# Patient Record
Sex: FEMALE | Race: WHITE
[De-identification: names, ages, dates, MRNs, and addresses within clinical notes are randomized per-mention and may not be internally consistent; named-entity substitution may affect disease eponyms.]

---

## 2017-03-21 ENCOUNTER — HOSPITAL ENCOUNTER (EMERGENCY)
Dept: HOSPITAL 10 - FTE | Age: 57
LOS: 1 days | Discharge: LEFT BEFORE DISCHARGE | End: 2017-03-22
Payer: COMMERCIAL

## 2017-03-21 VITALS
BODY MASS INDEX: 25.78 KG/M2 | WEIGHT: 145.51 LBS | WEIGHT: 145.51 LBS | HEIGHT: 63 IN | HEIGHT: 63 IN | BODY MASS INDEX: 25.78 KG/M2

## 2017-03-21 DIAGNOSIS — S20.01XA: Primary | ICD-10-CM
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
## 2017-03-21 DIAGNOSIS — I10: ICD-10-CM
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
## 2017-03-21 PROCEDURE — 76642 ULTRASOUND BREAST LIMITED: CPT

## 2017-03-21 NOTE — ERD
ER Documentation


Chief Complaint


Date/Time


DATE: 3/21/17 


TIME: 10:11


Chief Complaint


bruise @ the right breast area





HPI


56-year-old female presents to ED complaining of bruising in the medial aspect 

of the right breast.  Patient stated that she first noticed it 6 days ago.  She 

did not have any pain in the right breast.  2 days ago, she noticed a tender 

lump in the area of the bruising.  She does not remember any trauma.  Patient 

has history of rheumatoid arthritis.  She had negative mammogram in December 2016.  Denies any family history of breast cancer or any other type of cancer.  

Denies fever or chills.





ROS


All systems reviewed and are negative except as per history of present illness.





Medications


Home Meds


Reported Medications


Sulfamethoxazole-Trimethoprim* (Bactrim* DS) 1 Tab Tab, 1 TAB PO DAILY


   9/17/13


Propranolol Hcl (Inderal LA) 60 Mg Capsr, 50 MG PO BID


   9/12/13


Losartan Potassium* (Cozaar*) 25 Mg Tablet, 50 MG PO BID


   9/12/13


Naproxen* (Aleve*) 220 Mg Capsule, 220 MG PO Y


   8/10/13





Allergies


Allergies:  


Coded Allergies:  


     No Known Allergy (Unverified , 9/12/13)





PMhx/Soc


History of Surgery:  Yes (breast reduction 83, appendectomy)


Anesthesia Reaction:  No


Hx Neurological Disorder:  No


Hx Respiratory Disorders:  No


Hx Cardiac Disorders:  Yes (htn)


Hx Psychiatric Problems:  No


Hx Miscellaneous Medical Probl:  Yes ( rheumatoid arthritis)


Hx Alcohol Use:  No


Hx Substance Use:  No


Hx Tobacco Use:  No





Physical Exam


Vitals





Vital Signs








  Date Time  Temp Pulse Resp B/P Pulse Ox O2 Delivery O2 Flow Rate FiO2


 


3/21/17 07:54 98.1 84 19 175/95 95   








Physical Exam


General impression:  Well-developed, well-nourished.  Alert, oriented, in no 

acute distress


Head:    Normocephalic, atraumatic.


Respiration:    Normal respiratory effort. Lungs clear to auscultate 

bilaterally. No wheezes, rales or rhonchi.


Cardiovascular: Regular rate and rhythm. No murmurs or extra heart sounds.


Abdomen:    Abdomen normal to inspection. Nontender. No masses or organomegaly. 

Bowel sounds normal.


Breasts:   A 5 cm area of old ecchymosis noted on the medial aspect of the 

right breast.  A 2-3 cm size firm, mobile mass noted within the border of the 

ecchymosis.  Breast exam otherwise normal.  No lymph nodes appreciated in the 

bilateral axilla.


Neuro:    Mental status normal, speech normal. CNS grossly intact. 


Skin:    Normal turgor. No rash or lesions.


Psych:    Normal mood and affect.


Results 24 hrs


PROCEDURE:   Right breast ultrasound. 


 


CLINICAL INDICATION:   Right breast palpable lesion and ecchymosis. 


 


TECHNIQUE:   High-resolution sonography of the right breast was performed in 

the axial and sagittal planes. 


 


COMPARISON:   No prior study is available for comparison. 


 


FINDINGS:


At the site of the palpable lesion and ecchymosis in the 3 o'clock position of 

the right breast, there is a 3.3 x 0.8 x 1.1 cm complex mass with cystic and 

solid components.  There is no other cystic or solid mass.


The breast parenchyma is otherwise normal. 


 


IMPRESSION:


1.  Complex mass with cystic and solid components measuring 3.3 x 0.8 x 1.1 cm 

in the 3 o'clock position of the right breast at the site of the palpable 

lesion and ecchymosis. This may be due to a hematoma.  Neoplasm such as a 

papilloma cannot be excluded.


2.  Any further management regarding this palpable lesion should be based upon 

clinical grounds. 


 


RPTAT: QQ


_____________________________________________ 


.Hugh Mckinney MD, MD           Date    Time 


Electronically viewed and signed by .Hugh Mckinney MD, MD on 03/21/2017 12:21 


 


D:  03/21/2017 12:21  T:  03/21/2017 12:21


.R/





CC: SIDDHARTH PALMER NP





Procedures/MDM


Well-appearing 56-year-old female presented to ED with contusion of the right 

breast.  Breast ultrasound was obtained, a complex mass with cystic and solid 

components measuring 3.3 x 0.8 x 1.1 cm in the 3 o'clock position at the site 

of the palpable lesion and ecchymosis is noted on ultrasound.  This may be due 

to hematoma.  Neoplasm such as a papilloma cannot be excluded.





Patient eloped before the ultrasound results had returned.  I wish to the 

patient via telephone, and informed her of the ultrasound results.  I advised 

patient to follow-up with her PCP for repeat ultrasound in about 2 weeks, and 

obtain a breast biopsy.  Patient expressed understanding.





Departure


Diagnosis:  


 Primary Impression:  


 Contusion of breast, right


Condition:  Good











SIDDHARTH PALMER NP Mar 21, 2017 10:17

## 2017-03-21 NOTE — RADRPT
PROCEDURE:   Right breast ultrasound. 

 

CLINICAL INDICATION:   Right breast palpable lesion and ecchymosis. 

 

TECHNIQUE:   High-resolution sonography of the right breast was performed in the axial and sagittal 
planes. 

 

COMPARISON:   No prior study is available for comparison. 

 

FINDINGS:

At the site of the palpable lesion and ecchymosis in the 3 o'clock position of the right breast, the
re is a 3.3 x 0.8 x 1.1 cm complex mass with cystic and solid components.  There is no other cystic 
or solid mass.

The breast parenchyma is otherwise normal. 

 

IMPRESSION:

1.  Complex mass with cystic and solid components measuring 3.3 x 0.8 x 1.1 cm in the 3 o'clock posi
tion of the right breast at the site of the palpable lesion and ecchymosis. This may be due to a hem
atoma.  Neoplasm such as a papilloma cannot be excluded.

2.  Any further management regarding this palpable lesion should be based upon clinical grounds. 

 

RPTAT: QQ

_____________________________________________ 

.Hugh Mckinney MD, MD           Date    Time 

Electronically viewed and signed by .Hugh Mckinney MD, MD on 03/21/2017 12:21 

 

D:  03/21/2017 12:21  T:  03/21/2017 12:21

.R/

## 2018-01-08 ENCOUNTER — HOSPITAL ENCOUNTER (EMERGENCY)
Age: 58
Discharge: HOME | End: 2018-01-08

## 2018-01-08 ENCOUNTER — HOSPITAL ENCOUNTER (EMERGENCY)
Dept: HOSPITAL 91 - FTE | Age: 58
Discharge: HOME | End: 2018-01-08
Payer: COMMERCIAL

## 2018-01-08 DIAGNOSIS — I10: ICD-10-CM

## 2018-01-08 DIAGNOSIS — N39.0: Primary | ICD-10-CM

## 2018-01-08 LAB
ADD UMIC: YES
UR ASCORBIC ACID: NEGATIVE MG/DL
UR BACTERIA: (no result) /HPF
UR BILIRUBIN (DIP): NEGATIVE MG/DL
UR BLOOD (DIP): (no result) MG/DL
UR CLARITY: (no result)
UR COLOR: (no result)
UR GLUCOSE (DIP): (no result) MG/DL
UR HYALINE CAST: (no result) /HPF
UR KETONES (DIP): NEGATIVE MG/DL
UR LEUKOCYTE ESTERASE (DIP): NEGATIVE LEU/UL
UR MUCUS: (no result) /HPF
UR NITRITE (DIP): POSITIVE MG/DL
UR PH (DIP): 5 (ref 5–9)
UR RBC: > 182 /HPF (ref 0–5)
UR SPECIFIC GRAVITY (DIP): 1.03 (ref 1–1.03)
UR SQUAMOUS EPITHELIAL CELL: (no result) /HPF
UR TOTAL PROTEIN (DIP): (no result) MG/DL
UR UROBILINOGEN (DIP): (no result) MG/DL
UR WBC: 55 /HPF (ref 0–5)

## 2018-01-08 PROCEDURE — 81001 URINALYSIS AUTO W/SCOPE: CPT

## 2018-01-08 PROCEDURE — 99283 EMERGENCY DEPT VISIT LOW MDM: CPT

## 2019-02-08 ENCOUNTER — HOSPITAL ENCOUNTER (EMERGENCY)
Dept: HOSPITAL 10 - FTE | Age: 59
Discharge: HOME | End: 2019-02-08
Payer: COMMERCIAL

## 2019-02-08 ENCOUNTER — HOSPITAL ENCOUNTER (EMERGENCY)
Dept: HOSPITAL 91 - FTE | Age: 59
Discharge: HOME | End: 2019-02-08
Payer: COMMERCIAL

## 2019-02-08 VITALS
WEIGHT: 147.27 LBS | BODY MASS INDEX: 25.14 KG/M2 | BODY MASS INDEX: 25.14 KG/M2 | HEIGHT: 64 IN | HEIGHT: 64 IN | WEIGHT: 147.27 LBS

## 2019-02-08 VITALS — RESPIRATION RATE: 18 BRPM | HEART RATE: 110 BPM | SYSTOLIC BLOOD PRESSURE: 147 MMHG | DIASTOLIC BLOOD PRESSURE: 75 MMHG

## 2019-02-08 DIAGNOSIS — I10: ICD-10-CM

## 2019-02-08 DIAGNOSIS — R30.0: Primary | ICD-10-CM

## 2019-02-08 LAB — URINE PH (DIP) POC: 6 (ref 5–8.5)

## 2019-02-08 PROCEDURE — 99283 EMERGENCY DEPT VISIT LOW MDM: CPT

## 2019-02-08 PROCEDURE — 81003 URINALYSIS AUTO W/O SCOPE: CPT

## 2019-02-08 PROCEDURE — 87086 URINE CULTURE/COLONY COUNT: CPT

## 2019-02-08 NOTE — ERD
ER Documentation


Chief Complaint


Chief Complaint





painful/burning urination/blood in urine x 1 day





HPI


58-year-old female who presents complaining of dysuria and urinary frequency 


with some light spotting that began today.  She has a history of frequent 


urinary tract infections in which she sees a urologist so she knows that she is 


getting a urine tract infection and wanted to get on top of this 1 early.  She 


states Cipro works best for her.  No fever.  No nausea or vomiting.  Secondarily


she is complaining of pain in her upper extremities secondary to rheumatoid arth


ritis.





ROS


All systems reviewed and are negative except as per history of present illness.





Medications


Home Meds


Active Scripts


Hydrocodone/Acetaminophen (Norco 5-325 Tablet) 1 Each Tablet, 1 EACH PO Q6, #15 


TAB


   Prov:BJORN LILLY PA-C         2/8/19


Ciprofloxacin Hcl* (Ciprofloxacin Hcl*) 500 Mg Tablet, 500 MG PO BID, #14 TAB


   Prov:BJORN LILLY PA-C         2/8/19


Ciprofloxacin Hcl* (Ciprofloxacin Hcl*) 500 Mg Tablet, 500 MG PO BID for 10 


Days, TAB


   Prov:FRANCISCA SANCHEZ         1/8/18


Reported Medications


Sulfamethoxazole-Trimethoprim* (Bactrim* DS) 1 Tab Tab, 1 TAB PO DAILY


   9/17/13


Propranolol Hcl (Inderal LA) 60 Mg Capsr, 50 MG PO BID


   9/12/13


Losartan Potassium* (Cozaar*) 25 Mg Tablet, 50 MG PO BID


   9/12/13


Naproxen* (Aleve*) 220 Mg Capsule, 220 MG PO PRN


   8/10/13





Allergies


Allergies:  


Coded Allergies:  


     No Known Allergy (Unverified , 1/8/18)





PMhx/Soc


History of Surgery:  Yes (breast surgery)


Anesthesia Reaction:  No


Hx Neurological Disorder:  No


Hx Respiratory Disorders:  No


Hx Cardiac Disorders:  Yes (htn)


Hx Psychiatric Problems:  No


Hx Miscellaneous Medical Probl:  Yes (uti)


Hx Alcohol Use:  No


Hx Substance Use:  No


Hx Tobacco Use:  No





FmHx


Family History:  No diabetes





Physical Exam


Vitals





Vital Signs


  Date      Temp  Pulse  Resp  B/P (MAP)   Pulse Ox  O2          O2 Flow    FiO2


Time                                                 Delivery    Rate


    2/8/19  98.2    110    18      147/75        97


     21:54                           (99)





Physical Exam


INITIAL VITAL SIGNS: Reviewed by me


GENERAL: Awake, alert and oriented x 4, well appearing, nontoxic, speaking in 


full sentences. No acute distress


HEAD: Atraumatic


NECK: Supple. No masses. Full range of motion.  No meningismus. No midline 


tenderness. 


RESPIRATORY: Clear to auscultation bilaterally. Symmetric chest wall rise.  No 


wheezing or rales. No accessory muscle use.  


CV: Regular rate and rhythm. No murmurs, rubs, or gallops.  


ABDOMEN: Soft, non-distended. Nontender. Negative Rutledge. Negative McBurneys 


point tenderness. No CVA tenderness bilaterally. No guarding. No rebound. 





EXTREMITIES: No clubbing or cyanosis. No edema. Moving all extremities normally.





Procedures/MDM


Urine sent for culture.  Patient started on Cipro.  Given small amount of Norco 


for her flareup of arthritic rheumatoid arthritis pain.  Patient counseled 


regarding my diagnostic impression and care plan. Prior to discharge all 


questions answered. Pt agrees with treatment plan and understands strict return 


precautions. Pt is instructed to follow up with primary care provider within 24-


48 hours. Precautionary instructions provided including instructions to return 


to the ER if not improving or for any worsening or changing symptoms or 


concerns.





Departure


Diagnosis:  


   Primary Impression:  


   Dysuria


Condition:  Stable


Patient Instructions:  Dysuria





Additional Instructions:  


Call your primary care doctor TOMORROW for an appointment during the next 1-2 


days.See the doctor sooner or return here if your condition worsens before your 


appointment time.











BJORN LILLY PA-C             Feb 8, 2019 23:10

## 2019-02-18 ENCOUNTER — HOSPITAL ENCOUNTER (EMERGENCY)
Dept: HOSPITAL 91 - E/R | Age: 59
Discharge: HOME | End: 2019-02-18
Payer: COMMERCIAL

## 2019-02-18 ENCOUNTER — HOSPITAL ENCOUNTER (EMERGENCY)
Dept: HOSPITAL 10 - E/R | Age: 59
Discharge: HOME | End: 2019-02-18
Payer: COMMERCIAL

## 2019-02-18 VITALS — SYSTOLIC BLOOD PRESSURE: 189 MMHG | RESPIRATION RATE: 20 BRPM | DIASTOLIC BLOOD PRESSURE: 88 MMHG | HEART RATE: 90 BPM

## 2019-02-18 VITALS
WEIGHT: 146.83 LBS | HEIGHT: 66 IN | BODY MASS INDEX: 23.6 KG/M2 | HEIGHT: 66 IN | WEIGHT: 146.83 LBS | BODY MASS INDEX: 23.6 KG/M2

## 2019-02-18 DIAGNOSIS — I10: ICD-10-CM

## 2019-02-18 DIAGNOSIS — N30.10: Primary | ICD-10-CM

## 2019-02-18 LAB
ADD UMIC: YES
UR ASCORBIC ACID: NEGATIVE MG/DL
UR BACTERIA: (no result) /HPF
UR BILIRUBIN (DIP): NEGATIVE MG/DL
UR BLOOD (DIP): NEGATIVE MG/DL
UR CLARITY: (no result)
UR COLOR: (no result)
UR GLUCOSE (DIP): NEGATIVE MG/DL
UR KETONES (DIP): NEGATIVE MG/DL
UR LEUKOCYTE ESTERASE (DIP): NEGATIVE LEU/UL
UR NITRITE (DIP): POSITIVE MG/DL
UR PH (DIP): 5 (ref 5–9)
UR RBC: 5 /HPF (ref 0–5)
UR SPECIFIC GRAVITY (DIP): 1.03 (ref 1–1.03)
UR SQUAMOUS EPITHELIAL CELL: (no result) /HPF
UR TOTAL PROTEIN (DIP): NEGATIVE MG/DL
UR UROBILINOGEN (DIP): (no result) MG/DL
UR WBC: 4 /HPF (ref 0–5)

## 2019-02-18 PROCEDURE — 99283 EMERGENCY DEPT VISIT LOW MDM: CPT

## 2019-02-18 PROCEDURE — 87086 URINE CULTURE/COLONY COUNT: CPT

## 2019-02-18 PROCEDURE — 81001 URINALYSIS AUTO W/SCOPE: CPT

## 2019-02-18 NOTE — ERD
ER Documentation


Chief Complaint


Chief Complaint





Complains of urine problems Hx of UTI





HPI


58-year-old female who presents to the emergency room complaining of dysuria 


urgency and frequency.  The patient states that several weeks ago she was 


started on Cipro but a very short course.  She states that she was recently 


started on Bactrim and still has symptoms of urinary frequency urgency and 


dysuria.  She states recurrent episodes of cystitis.  The only thing that 


usually works for her is 14 days of ciprofloxacin.  She is requesting a 


prescription.  She denies any fevers chills or flank pain.  Blood pressure noted


to be elevated no chest pain or strokelike symptoms.  Patient has followed up 


with a urologist but not recently.





ROS


All systems reviewed and are negative except as per history of present illness.





Medications


Home Meds


Active Scripts


Ciprofloxacin Hcl* (Ciprofloxacin Hcl*) 500 Mg Tablet, 500 MG PO BID for 14 


Days, TAB


   Prov:FRANNY VIDAL MD         2/18/19


Hydrocodone/Acetaminophen (Norco 5-325 Tablet) 1 Each Tablet, 1 EACH PO Q6, #15 


TAB


   Prov:BJORN LILLY PA-C         2/8/19


Ciprofloxacin Hcl* (Ciprofloxacin Hcl*) 500 Mg Tablet, 500 MG PO BID, #14 TAB


   Prov:BJORN LILLY PA-C         2/8/19


Ciprofloxacin Hcl* (Ciprofloxacin Hcl*) 500 Mg Tablet, 500 MG PO BID for 10 


Days, TAB


   Prov:FRANCISCA SANCHEZ         1/8/18


Reported Medications


Sulfamethoxazole-Trimethoprim* (Bactrim* DS) 1 Tab Tab, 1 TAB PO DAILY


   9/17/13


Propranolol Hcl (Inderal LA) 60 Mg Capsr, 50 MG PO BID


   9/12/13


Losartan Potassium* (Cozaar*) 25 Mg Tablet, 50 MG PO BID


   9/12/13


Naproxen* (Aleve*) 220 Mg Capsule, 220 MG PO PRN


   8/10/13





Allergies


Allergies:  


Coded Allergies:  


     No Known Allergy (Unverified , 1/8/18)





PMhx/Soc


History of Surgery:  Yes (breast surgery)


Anesthesia Reaction:  No


Hx Neurological Disorder:  No


Hx Respiratory Disorders:  No


Hx Cardiac Disorders:  Yes (htn)


Hx Psychiatric Problems:  No


Hx Miscellaneous Medical Probl:  Yes (uti)


Hx Alcohol Use:  No


Hx Substance Use:  No


Hx Tobacco Use:  No





FmHx


Family History:  No diabetes





Physical Exam


Vitals





Vital Signs


  Date      Temp  Pulse  Resp  B/P (MAP)   Pulse Ox  O2          O2 Flow    FiO2


Time                                                 Delivery    Rate


   2/18/19  97.0     90    20      189/88        98  Room Air


     19:21                          (121)


   2/18/19  97.0     88    20      201/88        98


     17:46                          (125)





Physical Exam


General: Well developed, well nourished, no acute distress


Head: Normocephalic, atraumatic.


Eyes: Pupils equally reactive, EOM intact


ENT: Moist mucous membranes


Neck: Supple, no lymphadenopathy


Respiratory: Lungs clear bilaterally, no distress


Cardiovascular: RRR, no murmurs, rubs, or gallops


Abdominal: Soft, non-tender, non-distended, no peritoneal signs


Back: No CVA tenderness


: Deferred


MSK: No edema, no unilateral swelling, 5/5 strength


Neurologic: Alert and oriented, moving all extremities, normal speech, no focal 


weakness, no cerebellar signs


Skin: No rash


Psych: Normal mood





Procedures/MDM


The patient is requesting for a 2-week prescription for ciprofloxacin.  We d


iscussed the risk benefits and alternatives.  I believe it is very possible that


the patient has interstitial cystitis rather than recurrent urinary tract 


infections.  I strongly recommend urology follow-up.  Most recent culture 


results was mixed organisms.  I believe a urinalysis and urine culture today 


would be reasonable.  The patient is currently on Bactrim.  I do not believe t


his is consistent with pyelonephritis or systemic infection that would warrant 


inpatient hospitalization.  This is a subacute and chronic issue for the 


patient.





Patient's blood pressure is elevated but she states that she did not take her 


blood pressure medication today.  No symptoms and no evidence of endorgan 


dysfunction.





The patient can be safely discharged with strict return precautions discussed 


and understood by the patient.





The patient does not have an identifiable emergent medical condition that 


warrants inpatient hospitalization at this time.  The patient is deemed safe for


discharge with outpatient follow-up.





We discussed follow up with the patient's primary care doctor within 24 to 48 


hours as needed.  We also discussed return to the emergency room for worsening 


symptoms or worsening condition.





Outpatient referral: Urology





Discharge Medications:


Cipro





Departure


Diagnosis:  


   Primary Impression:  


   UTI (urinary tract infection)


   Urinary tract infection type:  acute cystitis  Hematuria presence:  without 


   hematuria  Qualified Codes:  N30.00 - Acute cystitis without hematuria


   Additional Impressions:  


   Interstitial cystitis


   Asymptomatic hypertensive urgency


Condition:  Stable


Patient Instructions:  Understanding Urinary Tract Infections (UTIs)


Referrals:  


CARMEN YARBROUGH MD





Additional Instructions:  


Call your primary care doctor TOMORROW for an appointment during the next 1 


WEEK.Tell the  that you were referred from this facility.See the doctor


sooner or return here if your  condition worsens before your appointment time.











FRANNY VIDAL MD          Feb 18, 2019 19:25

## 2019-03-04 ENCOUNTER — HOSPITAL ENCOUNTER (EMERGENCY)
Dept: HOSPITAL 91 - FTE | Age: 59
LOS: 1 days | Discharge: HOME | End: 2019-03-05
Payer: COMMERCIAL

## 2019-03-04 ENCOUNTER — HOSPITAL ENCOUNTER (EMERGENCY)
Dept: HOSPITAL 10 - FTE | Age: 59
LOS: 1 days | Discharge: HOME | End: 2019-03-05
Payer: COMMERCIAL

## 2019-03-04 VITALS — HEIGHT: 55 IN | BODY MASS INDEX: 34.74 KG/M2 | WEIGHT: 150.13 LBS

## 2019-03-04 DIAGNOSIS — N39.0: Primary | ICD-10-CM

## 2019-03-04 DIAGNOSIS — I10: ICD-10-CM

## 2019-03-04 LAB
ADD MAN DIFF?: NO
ADD UMIC: YES
ANION GAP: 12 (ref 5–13)
BASOPHIL #: 0.1 10^3/UL (ref 0–0.1)
BASOPHILS %: 0.9 % (ref 0–2)
BLOOD UREA NITROGEN: 20 MG/DL (ref 7–20)
CALCIUM: 9.1 MG/DL (ref 8.4–10.2)
CARBON DIOXIDE: 17 MMOL/L (ref 21–31)
CHLORIDE: 113 MMOL/L (ref 97–110)
CREATININE: 0.63 MG/DL (ref 0.44–1)
EOSINOPHILS #: 0.3 10^3/UL (ref 0–0.5)
EOSINOPHILS %: 2.4 % (ref 0–7)
GLUCOSE: 97 MG/DL (ref 70–220)
HEMATOCRIT: 36.2 % (ref 37–47)
HEMOGLOBIN: 11.7 G/DL (ref 12–16)
IMMATURE GRANS #M: 0.16 10^3/UL (ref 0–0.03)
IMMATURE GRANS % (M): 1.3 % (ref 0–0.43)
LYMPHOCYTES #: 3.2 10^3/UL (ref 0.8–2.9)
LYMPHOCYTES %: 26.2 % (ref 15–51)
MEAN CORPUSCULAR HEMOGLOBIN: 34.1 PG (ref 29–33)
MEAN CORPUSCULAR HGB CONC: 32.3 G/DL (ref 32–37)
MEAN CORPUSCULAR VOLUME: 105.5 FL (ref 82–101)
MEAN PLATELET VOLUME: 8.8 FL (ref 7.4–10.4)
MONOCYTE #: 1 10^3/UL (ref 0.3–0.9)
MONOCYTES %: 7.9 % (ref 0–11)
NEUTROPHIL #: 7.5 10^3/UL (ref 1.6–7.5)
NEUTROPHILS %: 61.3 % (ref 39–77)
NUCLEATED RED BLOOD CELLS #: 0 10^3/UL (ref 0–0)
NUCLEATED RED BLOOD CELLS%: 0 /100WBC (ref 0–0)
PLATELET COUNT: 344 10^3/UL (ref 140–415)
POTASSIUM: 3.5 MMOL/L (ref 3.5–5.1)
RED BLOOD COUNT: 3.43 10^6/UL (ref 4.2–5.4)
RED CELL DISTRIBUTION WIDTH: 14.2 % (ref 11.5–14.5)
SODIUM: 142 MMOL/L (ref 135–144)
UR ASCORBIC ACID: NEGATIVE MG/DL
UR BACTERIA: (no result) /HPF
UR BILIRUBIN (DIP): NEGATIVE MG/DL
UR BLOOD (DIP): (no result) MG/DL
UR CLARITY: (no result)
UR COLOR: (no result)
UR GLUCOSE (DIP): NEGATIVE MG/DL
UR KETONES (DIP): NEGATIVE MG/DL
UR LEUKOCYTE ESTERASE (DIP): (no result) LEU/UL
UR NITRITE (DIP): NEGATIVE MG/DL
UR PH (DIP): 5 (ref 5–9)
UR RBC: > 182 /HPF (ref 0–5)
UR SPECIFIC GRAVITY (DIP): 1.02 (ref 1–1.03)
UR SQUAMOUS EPITHELIAL CELL: (no result) /HPF
UR TOTAL PROTEIN (DIP): (no result) MG/DL
UR UROBILINOGEN (DIP): NEGATIVE MG/DL
UR WBC: 27 /HPF (ref 0–5)
WHITE BLOOD COUNT: 12.3 10^3/UL (ref 4.8–10.8)

## 2019-03-04 PROCEDURE — 36415 COLL VENOUS BLD VENIPUNCTURE: CPT

## 2019-03-04 PROCEDURE — 99285 EMERGENCY DEPT VISIT HI MDM: CPT

## 2019-03-04 PROCEDURE — 85025 COMPLETE CBC W/AUTO DIFF WBC: CPT

## 2019-03-04 PROCEDURE — 87086 URINE CULTURE/COLONY COUNT: CPT

## 2019-03-04 PROCEDURE — 96375 TX/PRO/DX INJ NEW DRUG ADDON: CPT

## 2019-03-04 PROCEDURE — 80048 BASIC METABOLIC PNL TOTAL CA: CPT

## 2019-03-04 PROCEDURE — 96366 THER/PROPH/DIAG IV INF ADDON: CPT

## 2019-03-04 PROCEDURE — 81001 URINALYSIS AUTO W/SCOPE: CPT

## 2019-03-04 PROCEDURE — 96365 THER/PROPH/DIAG IV INF INIT: CPT

## 2019-03-04 PROCEDURE — 74177 CT ABD & PELVIS W/CONTRAST: CPT

## 2019-03-04 RX ADMIN — CEFTRIAXONE 1 MLS/HR: 1 INJECTION, SOLUTION INTRAVENOUS at 23:36

## 2019-03-04 RX ADMIN — THIAMINE HYDROCHLORIDE 1 MLS/HR: 100 INJECTION, SOLUTION INTRAMUSCULAR; INTRAVENOUS at 23:37

## 2019-03-04 RX ADMIN — MORPHINE SULFATE 1 MG: 2 INJECTION, SOLUTION INTRAMUSCULAR; INTRAVENOUS at 23:36

## 2019-03-04 RX ADMIN — PHENAZOPYRIDINE HYDROCHLORIDE 1 MG: 100 TABLET ORAL at 23:36

## 2019-03-04 NOTE — ERD
ER Documentation


Chief Complaint


Chief Complaint





bib self, cc: hematuria and dysuria for 2 weeks,





HPI


58-year-old female, presents the emergency department, complaining of 2 weeks 


with persistent dysuria and hematuria, associated with pelvic pressure.  


Otherwise no fever, no chills, no nausea or vomiting.





ROS


All systems reviewed and are negative except as per history of present illness.





Medications


Home Meds


Active Scripts


Hydrocodone/Acetaminophen (Norco 5-325 Tablet) 1 Each Tablet, 1 TAB PO BID PRN 


for PAIN, #8 TAB


   Prov:JM ENRIQUEZ MD         3/5/19


Phenazopyridine Hcl* (Pyridium*) 200 Mg Tab, 200 MG PO TID PRN for URINARY PAIN,


#6 TAB


   Prov:JM ENRIQUEZ MD         3/5/19


Nitrofurantoin Monohyd Macrocr* (Macrobid*) 100 Mg Capsr, 100 MG PO BID for 14 


Days, #28 CAP


   Prov:JM ENRIQUEZ MD         3/5/19


Ciprofloxacin Hcl* (Ciprofloxacin Hcl*) 500 Mg Tablet, 500 MG PO BID for 14 


Days, TAB


   Prov:FRANNY VIDAL MD         19


Hydrocodone/Acetaminophen (Norco 5-325 Tablet) 1 Each Tablet, 1 EACH PO Q6, #15 


TAB


   Prov:BJORN LILLY PA-C         19


Ciprofloxacin Hcl* (Ciprofloxacin Hcl*) 500 Mg Tablet, 500 MG PO BID, #14 TAB


   Prov:BJORN LILLY PA-C         19


Ciprofloxacin Hcl* (Ciprofloxacin Hcl*) 500 Mg Tablet, 500 MG PO BID for 10 


Days, TAB


   Prov:FRANCISCA SANCHEZ         18


Reported Medications


Sulfamethoxazole-Trimethoprim* (Bactrim* DS) 1 Tab Tab, 1 TAB PO DAILY


   13


Propranolol Hcl (Inderal LA) 60 Mg Capsr, 50 MG PO BID


   13


Losartan Potassium* (Cozaar*) 25 Mg Tablet, 50 MG PO BID


   13


Naproxen* (Aleve*) 220 Mg Capsule, 220 MG PO PRN


   8/10/13





Allergies


Allergies:  


Coded Allergies:  


     No Known Allergy (Unverified , 18)





PMhx/Soc


History of Surgery:  Yes (breast surgery)


Anesthesia Reaction:  No


Hx Neurological Disorder:  No


Hx Respiratory Disorders:  No


Hx Cardiac Disorders:  Yes (htn)


Hx Psychiatric Problems:  No


Hx Miscellaneous Medical Probl:  Yes (uti)


Hx Alcohol Use:  No


Hx Substance Use:  No


Hx Tobacco Use:  No





Physical Exam


Vitals





Vital Signs


  Date      Temp  Pulse  Resp  B/P (MAP)   Pulse Ox  O2          O2 Flow    FiO2


Time                                                 Delivery    Rate


    3/4/19  98.3    102    20      121/93       100


     19:17                          (102)





Physical Exam


Const:   No acute distress


Head:   Atraumatic 


Eyes:    Normal Conjunctiva


ENT:    Normal External Ears, Nose and Mouth.


Neck:               Full range of motion. No meningismus.


Resp:   Clear to auscultation bilaterally


Cardio:   Regular rate and rhythm, no murmurs


Abd:    Soft, non tender, non distended. Normal bowel sounds


Skin:   No petechiae or rashes


Back:   No midline or flank tenderness


Ext:    No cyanosis, or edema


Neur:   Awake and alert


Psych:    Normal Mood and Affect


Result Diagram:  


3/4/19 2325                                                                     


          3/4/19 2325





Results 24 hrs





Laboratory Tests


              Test
                                   3/4/19
23:25


              White Blood Count                      12.3 10^3/ul


              Red Blood Count                        3.43 10^6/ul


              Hemoglobin                                11.7 g/dl


              Hematocrit                                   36.2 %


              Mean Corpuscular Volume                    105.5 fl


              Mean Corpuscular Hemoglobin                 34.1 pg


              Mean Corpuscular Hemoglobin
Concent      32.3 g/dl 



              Red Cell Distribution Width                  14.2 %


              Platelet Count                          344 10^3/UL


              Mean Platelet Volume                         8.8 fl


              Immature Granulocytes %                     1.300 %


              Neutrophils %                                61.3 %


              Lymphocytes %                                26.2 %


              Monocytes %                                   7.9 %


              Eosinophils %                                 2.4 %


              Basophils %                                   0.9 %


              Nucleated Red Blood Cells %             0.0 /100WBC


              Immature Granulocytes #               0.160 10^3/ul


              Neutrophils #                           7.5 10^3/ul


              Lymphocytes #                           3.2 10^3/ul


              Monocytes #                             1.0 10^3/ul


              Eosinophils #                           0.3 10^3/ul


              Basophils #                             0.1 10^3/ul


              Nucleated Red Blood Cells #             0.0 10^3/ul


              Urine Color                          RED


              Urine Clarity                        CLOUDY


              Urine pH                                        5.0


              Urine Specific Gravity                        1.025


              Urine Ketones                        NEGATIVE mg/dL


              Urine Nitrite                        NEGATIVE mg/dL


              Urine Bilirubin                      NEGATIVE mg/dL


              Urine Urobilinogen                   NEGATIVE mg/dL


              Urine Leukocyte Esterase             TRACE Bogdan/ul


              Urine Microscopic RBC                > 182 /HPF


              Urine Microscopic WBC                       27 /HPF


              Urine Squamous Epithelial
Cells      FEW /HPF 



              Urine Bacteria                       FEW /HPF


              Urine Hemoglobin                           3+ mg/dL


              Urine Glucose                        NEGATIVE mg/dL


              Urine Total Protein                        2+ mg/dl


              Sodium Level                             142 mmol/L


              Potassium Level                          3.5 mmol/L


              Chloride Level                           113 mmol/L


              Carbon Dioxide Level                      17 mmol/L


              Anion Gap                                        12


              Blood Urea Nitrogen                        20 mg/dl


              Creatinine                               0.63 mg/dl


              Est Glomerular Filtrat Rate
mL/min   > 60 mL/min 



              Glucose Level                              97 mg/dl


              Calcium Level                             9.1 mg/dl





Current Medications


 Medications
   Dose
          Sig/Daniel
       Start Time
   Status  Last


 (Trade)       Ordered        Route
 PRN     Stop Time              Admin
Dose


                              Reason                                Admin


 Sodium         1,000 ml @ 
   Q1H ONCE
      3/4/19        DC            3/4/19


Chloride       1,000 mls/hr   IV
            23:30
 3/5/19                23:37



                                             00:29


 Morphine       2 mg           ONCE  STAT
    3/4/19        DC            3/4/19


Sulfate
                      IV
            23:16
 3/4/19                23:36



(morphine)                                   23:17


 Ceftriaxone    50 ml @ 
      ONCE  ONCE
    3/4/19        DC            3/4/19


Sodium         100 mls/hr     IVPB
          23:30
 3/4/19                23:36



                                             23:59


                200 mg         ONCE  ONCE
    3/4/19        DC            3/4/19


Phenazopyridi                 PO
            23:30
 3/4/19                23:36



ne
 HCl
                                     23:31


(Pyridium)


 Sodium         100 ml @ ud    STK-MED        3/5/19        DC            3/5/19


Chloride                      ONCE
 .ROUTE
  00:43
 3/5/19                01:06



                                             00:44


 Iohexol
       150 ml         STK-MED        3/5/19        DC            3/5/19


(Omnipaque                    ONCE
 .ROUTE
  00:43
 3/5/19                01:06



300mg/
 ml)                                  00:44


DIAGNOSTIC IMAGING REPORT





Patient: BARBIE POLANCO   : 1960   Age: 58  Sex: F                    


   


MR #:    [id number]   Acct #:   [id number]    DOS: 19


Ordering MD: JM ENRIQUEZ MD   Location:  [facility]   Room/Bed:            


                               





PROCEDURE:   CT Abdomen and pelvis with contrast. 


 


CLINICAL INDICATION:   Abdominal pain. 


 


TECHNIQUE:    CT scan of the abdomen and pelvis with contrast was performed on a


multi-detector high-resolution CT scanner.  The patient was scanned following 


the uncomplicated administration of 100 cc of Omnipaque 300 intravenous 


contrast.  Coronal and sagittal reformatted images were obtained from the axial 


source images. Images were reviewed on a high-resolution PACS workstation. DICOM


images are available.


 


One or more of the following dose reduction techniques were used:


- Automated exposure control.


- Adjustment of the mA and/or kV according to patient size.


- Use of iterative reconstruction technique.


 


Exam CTD/vol = 14.73 mGy.


Total exam DLP = 831.29 mGy-cm.   


 


COMPARISON:   2013. 


 


FINDINGS:


Evaluation of the lung bases demonstrates mild bibasilar atelectasis.


 


Abdomen:  The liver is normal in size.  There is a cyst within the left lobe of 


the liver measuring 1.5 x 1.1 cm. There is no dilatation of the biliary tree.  


The gallbladder is not distended.  The spleen, pancreas and bilateral adrenal 


glands are within normal limits.  Bilateral kidneys are normal in size with 


symmetric enhancement.  There are small renal cysts bilaterally. There is no 


hydronephrosis or hydroureter.  There is no retroperitoneal adenopathy.  The ab


dominal aorta is of normal caliber.


 


There is no abnormal bowel wall thickening or distension.  There is no bowel 


obstruction or free air. The appendix is not visualized.  There are few 


scattered colonic diverticuli without evidence of diverticulitis.  There is no 


ascites.


 


Pelvis:  The bladder is unremarkable.  The uterus and adnexa are within normal 


limits.  There is no significant pelvic adenopathy or free fluid.


 


Evaluation of the osseous structures demonstrates a lucent lesion along the 


anterior aspect of the left femoral head and neck pain with surrounding 


stranding and small joint effusion. There is a lucent lesion within the left 


superior acetabulum. There is old fracture/deformity of the right inferior pubic


ramus.


 


IMPRESSION:


Lucent lesions within the left superior acetabulum and femoral head and neck 


with left hip joint space effusion and soft tissue swelling, new compared with 


2013. Findings could represent degenerative subchondral cysts. Clinical 


correlation and follow-up is recommended to exclude osteomyelitis and septic 


joint.


 


Few scattered colonic diverticuli without evidence of diverticulitis.


 


Stable small hepatic cyst.


 


Bilateral small renal cysts.


 


Otherwise no acute abnormality identified within the abdomen and pelvis.


_____________________________________________ 


.Dawine Washington MD, MD           Date    Time 


Electronically viewed and signed by .Dwaine Washington MD, MD on 2019 01:27





Procedures/MDM


Differential diagnosis include but not limited to: UTI, colitis, 


gastroenteritis, kidney stones, irritable bowel syndrome, inflammatory bowel 


syndrome, malabsorption syndrome, cholelithiasis, food intolerance, medication 


side effect, pancreatitis, diverticulitis, bowel obstruction.  Low suspicion for


acute abdomen


Physical examination and clinical presentation consistent most likely with 


urinary tract infection.


Results and clinical impression discussed with patient who agrees with 


management. The patient is stable to be treated outpatient and will be 


discharged home, some side effects of prescribed medications (headache, rash, 


nausea, vomiting, diarrhea, drowsiness, habituation, bleeding, hypertension, 


interactions with other medications) were reviewed.





The patient was instructed to follow up with the primary care provider in the 


next 48h.  If symptoms persist, worsen or new symptoms develop, then patient 


should return to the ED immediately.





Instructions explained and given directly by me to the patient with 


acknowledgment and demonstrated understanding.





Disclaimer: Inadvertent spelling and grammatical errors are likely due to 


EHR/dictation software use and do not reflect on the overall quality of patient 


care. Also, please note that the electronic time recorded on this note does not 


necessarily reflect the actual time of the patient encounter.





Departure


Diagnosis:  


   Primary Impression:  


   UTI (urinary tract infection)


Condition:  Stable


Patient Instructions:  Understanding Urinary Tract Infections (UTIs)





Additional Instructions:  


Thank you very much for allowing us to participate in your care. 


Your health and safety is our top priority at [facility].





Call your primary care doctor TOMORROW for an appointment during the next 2-4 


days and bring all the information and medications prescribed. 





Have prescriptions filled and follow precisely the directions on the label. 





If the symptoms get worse and your provider is unavailable, return to the E


mergency Department immediately.











JM ENRIQUEZ MD       Mar 4, 2019 23:15

## 2019-03-05 VITALS — HEART RATE: 80 BPM | SYSTOLIC BLOOD PRESSURE: 144 MMHG | DIASTOLIC BLOOD PRESSURE: 89 MMHG | RESPIRATION RATE: 19 BRPM

## 2019-03-05 RX ADMIN — IOHEXOL 1 ML: 300 INJECTION, SOLUTION INTRAVENOUS at 01:06

## 2019-03-05 RX ADMIN — VASOPRESSIN 1 ML/SEC: 20 INJECTION, SOLUTION INTRAMUSCULAR; SUBCUTANEOUS at 01:06

## 2019-07-31 ENCOUNTER — HOSPITAL ENCOUNTER (EMERGENCY)
Dept: HOSPITAL 91 - FTE | Age: 59
LOS: 1 days | Discharge: HOME | End: 2019-08-01
Payer: COMMERCIAL

## 2019-07-31 ENCOUNTER — HOSPITAL ENCOUNTER (EMERGENCY)
Dept: HOSPITAL 10 - FTE | Age: 59
LOS: 1 days | Discharge: HOME | End: 2019-08-01
Payer: COMMERCIAL

## 2019-07-31 VITALS
HEIGHT: 64 IN | WEIGHT: 139.77 LBS | BODY MASS INDEX: 23.86 KG/M2 | BODY MASS INDEX: 23.86 KG/M2 | WEIGHT: 139.77 LBS | HEIGHT: 64 IN

## 2019-07-31 VITALS — DIASTOLIC BLOOD PRESSURE: 74 MMHG | RESPIRATION RATE: 18 BRPM | HEART RATE: 81 BPM | SYSTOLIC BLOOD PRESSURE: 165 MMHG
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
## 2019-07-31 DIAGNOSIS — S61.257A: ICD-10-CM
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
## 2019-07-31 DIAGNOSIS — N30.00: ICD-10-CM

## 2019-07-31 DIAGNOSIS — M25.552: Primary | ICD-10-CM

## 2019-07-31 DIAGNOSIS — M25.562: ICD-10-CM

## 2019-07-31 DIAGNOSIS — I10: ICD-10-CM

## 2019-07-31 PROCEDURE — 81001 URINALYSIS AUTO W/SCOPE: CPT

## 2019-07-31 PROCEDURE — 81003 URINALYSIS AUTO W/O SCOPE: CPT

## 2019-07-31 PROCEDURE — 96372 THER/PROPH/DIAG INJ SC/IM: CPT

## 2019-07-31 PROCEDURE — 99284 EMERGENCY DEPT VISIT MOD MDM: CPT

## 2019-08-01 LAB
ADD UMIC: YES
UR ASCORBIC ACID: NEGATIVE MG/DL
UR BACTERIA: (no result) /HPF
UR BILIRUBIN (DIP): NEGATIVE MG/DL
UR BLOOD (DIP): NEGATIVE MG/DL
UR CLARITY: CLEAR
UR COLOR: YELLOW
UR GLUCOSE (DIP): NEGATIVE MG/DL
UR KETONES (DIP): NEGATIVE MG/DL
UR LEUKOCYTE ESTERASE (DIP): (no result) LEU/UL
UR MUCUS: (no result) /HPF
UR NITRITE (DIP): NEGATIVE MG/DL
UR PH (DIP): 6 (ref 5–9)
UR RBC: 2 /HPF (ref 0–5)
UR SPECIFIC GRAVITY (DIP): 1.01 (ref 1–1.03)
UR SQUAMOUS EPITHELIAL CELL: (no result) /HPF
UR TOTAL PROTEIN (DIP): NEGATIVE MG/DL
UR UROBILINOGEN (DIP): NEGATIVE MG/DL
UR WBC: 13 /HPF (ref 0–5)
URINE PH (DIP) POC: 6.5 (ref 5–8.5)

## 2019-08-01 RX ADMIN — METHYLPREDNISOLONE SODIUM SUCCINATE 1 MG: 125 INJECTION, POWDER, FOR SOLUTION INTRAMUSCULAR; INTRAVENOUS at 01:42

## 2019-08-01 RX ADMIN — HYDROCODONE BITARTRATE AND ACETAMINOPHEN 1 TAB: 5; 325 TABLET ORAL at 01:42

## 2019-08-03 NOTE — ERD
ER Documentation


Chief Complaint


Chief Complaint





pain left hip on and off x 2 years, worse today, also c/o painful urination





HPI


59yo F presents to the ED with multiple complaints. Pt complaining of left hip 


pain off and on x 2 years with associated knee pain increasing in severity over 


past few days. Pt scheduled for surgery on August 29th, and states to receive 


cortisone injection to the joints for her pain. She is requesting a cortisone i


njection at this time. Pt also complaining of dysuria and decreased urinary 


output x 3 days. She notes a hx of recurrent UTI's, and states presentation 


often similar to her current presentation. Pt also believes to have sustained a 


dog bite 2-3 days ago, however, states to "not remember if the dog bit" her." 


She states she was feeding dog and believes he may have bit her left hand, 


episode happened so quickly she does not remember. Pt notes her tetanus is UTD, 


which she received last year. She denies fevers, chills, nausea, vomiting, or 


swelling at site of bite. Pt is able to ambulate without loss of function to the


effected extremities.





ROS


All systems reviewed and are negative except as per history of present illness.





Medications


Home Meds


Active Scripts


Amoxicillin/Potassium Clav (Amox-Clav 875-125 mg Tablet) 875-125 mg Tab, 1 TAB 


PO BID for 10 Days, #20 TAB


   Prov:HO SERNA PA-C         8/1/19


Naloxone HCl nasal spray (Narcan 4 mg/0.1 mL nasal) 4 Mg Spray, 4 MG NS .Q2-3MIN


for OPIOID OVERDOSE, #2 SPRAY 0 Refills


   Spray 0.1 mL into one nostril.  Repeat with second device into other


   nostril after 2-3 minutes if no or minimal response


   Prov:HO SERNA PA-C         8/1/19


Hydrocodone/Acetaminophen (Norco 5-325 Tablet) 1 Each Tablet, 1 TAB PO Q6H PRN 


for PAIN, #12 TAB


   Prov:HO SERNA PA-C         8/1/19


Hydrocodone/Acetaminophen (Norco 5-325 Tablet) 1 Each Tablet, 1 TAB PO BID PRN 


for PAIN, #8 TAB


   Prov:JM ENRIQUEZ MD         3/5/19


Phenazopyridine Hcl* (Pyridium*) 200 Mg Tab, 200 MG PO TID PRN for URINARY PAIN,


#6 TAB


   Prov:JM ENRIQUEZ MD         3/5/19


Nitrofurantoin Monohyd Macrocr* (Macrobid*) 100 Mg Capsr, 100 MG PO BID for 14 


Days, #28 CAP


   Prov:JM ENRIQUEZ MD         3/5/19


Ciprofloxacin Hcl* (Ciprofloxacin Hcl*) 500 Mg Tablet, 500 MG PO BID for 14 


Days, TAB


   Prov:FRANNY VIDAL MD         2/18/19


Hydrocodone/Acetaminophen (Norco 5-325 Tablet) 1 Each Tablet, 1 EACH PO Q6, #15 


TAB


   Prov:BJORN LILLY PA-C         2/8/19


Ciprofloxacin Hcl* (Ciprofloxacin Hcl*) 500 Mg Tablet, 500 MG PO BID, #14 TAB


   Prov:BJORN LILLY PA-C         2/8/19


Ciprofloxacin Hcl* (Ciprofloxacin Hcl*) 500 Mg Tablet, 500 MG PO BID for 10 


Days, TAB


   Prov:FRANCISCA SANCHEZ         1/8/18


Reported Medications


Sulfamethoxazole-Trimethoprim* (Bactrim* DS) 1 Tab Tab, 1 TAB PO DAILY


   9/17/13


Propranolol Hcl (Inderal LA) 60 Mg Capsr, 50 MG PO BID


   9/12/13


Losartan Potassium* (Cozaar*) 25 Mg Tablet, 50 MG PO BID


   9/12/13


Naproxen* (Aleve*) 220 Mg Capsule, 220 MG PO PRN


   8/10/13


Discontinued Scripts


Nitrofurantoin Monohyd Macrocr* (Macrobid*) 100 Mg Capsr, 100 MG PO BID for 7 


Days, #14 CAP


   Prov:HO SERNA PA-C         8/1/19





Allergies


Allergies:  


Coded Allergies:  


     No Known Allergy (Unverified , 1/8/18)





PMhx/Soc


History of Surgery:  Yes (Blair Breast Reduction)


Anesthesia Reaction:  No


Hx Neurological Disorder:  No


Hx Respiratory Disorders:  No


Hx Cardiac Disorders:  Yes (HTN)


Hx Psychiatric Problems:  No


Hx Miscellaneous Medical Probl:  Yes (Chronic UTI)


Hx Alcohol Use:  No


Hx Substance Use:  No


Hx Tobacco Use:  No


Smoking Status:  Never smoker





FmHx


Family History:  No diabetes, No coronary disease, No other





Physical Exam


Vitals





Vital Signs


  Date      Temp  Pulse  Resp  B/P (MAP)   Pulse Ox  O2          O2 Flow    FiO2


Time                                                 Delivery    Rate


   7/31/19  97.9     81    18      165/74        98


     23:28                          (104)





Physical Exam


Const:   No acute distress


Head:   Atraumatic 


Eyes:    Normal Conjunctiva


ENT:    Normal External Ears, Nose and Mouth.


Neck:               Full range of motion. No meningismus.


Resp:   Clear to auscultation bilaterally


Cardio:   Regular rate and rhythm, no murmurs


Abd:    Soft, non tender, non distended. Normal bowel sounds


Skin:   No petechiae or rashes. Superficial abrasion to the volar aspect of the 


left 5th metacarpal. No visible puncture wound. No warmth, no discharge, no 


surrounding erythema, no linear streaking. No fluctuance, no induration.


Back:   No midline or flank tenderness. Full ROM.


Ext:    No cyanosis, or edema. TPP left hip and knee, pain with passive ROM. 


Able to bear weight and ambulate appropriately. Pedal pulse 2+. Sensation intact


to light touch. No erythema and calf tenderness. Negative hector.


Neur:   Awake and alert


Psych:    Normal Mood and Affect


Results 24 hrs





Laboratory Tests


       Test
                                   8/1/19
01:28  8/1/19
01:36


       Urine Color                          YELLOW


       Urine Clarity                        CLEAR


       Urine pH                                        6.0


       Urine Specific Gravity                        1.009


       Urine Ketones                        NEGATIVE mg/dL


       Urine Nitrite                        NEGATIVE mg/dL


       Urine Bilirubin                      NEGATIVE mg/dL


       Urine Urobilinogen                   NEGATIVE mg/dL


       Urine Leukocyte Esterase             TRACE Bogdan/ul


       Urine Microscopic RBC                        2 /HPF


       Urine Microscopic WBC                       13 /HPF


       Urine Squamous Epithelial
Cells      FEW /HPF 
       



       Urine Bacteria                       FEW /HPF


       Urine Mucus                          FEW /HPF


       Urine Hemoglobin                     NEGATIVE mg/dL


       Urine Glucose                        NEGATIVE mg/dL


       Urine Total Protein                  NEGATIVE mg/dl


       Bedside Urine pH (LAB)                                        6.5


       Bedside Urine Protein (LAB)                           Negative


       Bedside Urine Glucose (UA)                            Negative


       Bedside Urine Ketones (LAB)                           Negative


       Bedside Urine Blood                                   Trace-lysed


       Bedside Urine Nitrite (LAB)                           Negative


       Bedside Urine Leukocyte
Esterase (L  
                Trace 






Current Medications


 Medications
   Dose
          Sig/Daniel
       Start Time
   Status  Last


 (Trade)       Ordered        Route
 PRN     Stop Time              Admin
Dose


                              Reason                                Admin


                125 mg         ONCE  ONCE
    8/1/19        DC            8/1/19


Methylprednis                 IM
            01:30
 8/1/19                01:42



olone
 Sodium                                01:31


Succinate



(Solu-Medrol)


                1 tab          ONCE  ONCE
    8/1/19        DC            8/1/19


Acetaminophen                 PO
            01:30
 8/1/19                01:42



/
                                           01:31


Hydrocodone


Bitart



(Norco


(5/325))








Procedures/MDM


MDM: This is a 59yo F who presents for chronic left hip and knee pain, UTI, and 


dog bite. Pt requesting cortisone joint injection at this time, I have advised 


her cortisone joint injection would need to be done by her current pain 


 or orthopedist, however, I could provide short course 


medications to help her pain while in ED. Pt unable to tolerate NSAIDS as she 


states to have "a hole" in her stomach. Pt received Solu-medrol injection and 


norco while in ED with improvement in pain upon reevaluation. Pt UA also showed 


traced leukocyte. I will be treating pt UTI and Dog bite with Augmentin so as to


avoid overuse of antibiotics as pt has a hx of recurrent abx use. At this time I


do not suspect fracture, septic joint, cellulitis, pyelonephritis, or other 


acute emergent process. Pt will be discharged home at this time with short 


course Norco for pain, CURES negative for recent opioid prescription and advised


to f/u with pain management within the next 1-2 days. I have counseled regarding


strict ED return precautions and pt is to return immediately if symptoms persist


or worsen, or if new symptoms develop. Pt expressed verbal understanding and 


agreement to treatment plan. All questions addressed and answered.





Departure


Diagnosis:  


   Primary Impression:  


   Hip pain


   Laterality:  left  Qualified Codes:  M25.552 - Pain in left hip


   Additional Impressions:  


   Urinary tract infection


   Urinary tract infection type:  acute cystitis  Hematuria presence:  without 


   hematuria  Qualified Codes:  N30.00 - Acute cystitis without hematuria


   Dog bite


   Encounter type:  initial encounter  Qualified Codes:  W54.0XXA - Bitten by 


   dog, initial encounter


   Knee pain


   Chronicity:  chronic  Laterality:  left  Qualified Codes:  M25.562 - Pain in 


   left knee; G89.29 - Other chronic pain


Condition:  Stable


Patient Instructions:  Hip Precautions, Understanding Urinary Tract Infections 


(UTIs), Dog Bite











HO SERNA PA-C             Aug 3, 2019 23:37

## 2019-08-29 ENCOUNTER — HOSPITAL ENCOUNTER (INPATIENT)
Dept: HOSPITAL 10 - REC | Age: 59
LOS: 1 days | Discharge: HOME | DRG: 470 | End: 2019-08-30
Attending: ORTHOPAEDIC SURGERY | Admitting: ORTHOPAEDIC SURGERY
Payer: COMMERCIAL

## 2019-08-29 ENCOUNTER — HOSPITAL ENCOUNTER (INPATIENT)
Dept: HOSPITAL 91 - REC | Age: 59
LOS: 1 days | Discharge: HOME | DRG: 470 | End: 2019-08-30
Payer: COMMERCIAL

## 2019-08-29 VITALS — SYSTOLIC BLOOD PRESSURE: 137 MMHG | HEART RATE: 87 BPM | DIASTOLIC BLOOD PRESSURE: 72 MMHG | RESPIRATION RATE: 16 BRPM

## 2019-08-29 VITALS — HEART RATE: 88 BPM | SYSTOLIC BLOOD PRESSURE: 129 MMHG | DIASTOLIC BLOOD PRESSURE: 67 MMHG | RESPIRATION RATE: 16 BRPM

## 2019-08-29 VITALS — DIASTOLIC BLOOD PRESSURE: 71 MMHG | RESPIRATION RATE: 18 BRPM | SYSTOLIC BLOOD PRESSURE: 135 MMHG | HEART RATE: 79 BPM

## 2019-08-29 VITALS — DIASTOLIC BLOOD PRESSURE: 86 MMHG | RESPIRATION RATE: 18 BRPM | HEART RATE: 76 BPM | SYSTOLIC BLOOD PRESSURE: 157 MMHG

## 2019-08-29 VITALS — RESPIRATION RATE: 10 BRPM | HEART RATE: 84 BPM | SYSTOLIC BLOOD PRESSURE: 115 MMHG | DIASTOLIC BLOOD PRESSURE: 63 MMHG

## 2019-08-29 VITALS — DIASTOLIC BLOOD PRESSURE: 68 MMHG | SYSTOLIC BLOOD PRESSURE: 121 MMHG | RESPIRATION RATE: 28 BRPM | HEART RATE: 84 BPM

## 2019-08-29 VITALS — HEART RATE: 88 BPM | DIASTOLIC BLOOD PRESSURE: 70 MMHG | RESPIRATION RATE: 18 BRPM | SYSTOLIC BLOOD PRESSURE: 124 MMHG

## 2019-08-29 VITALS — DIASTOLIC BLOOD PRESSURE: 68 MMHG | RESPIRATION RATE: 24 BRPM | HEART RATE: 84 BPM | SYSTOLIC BLOOD PRESSURE: 120 MMHG

## 2019-08-29 VITALS — SYSTOLIC BLOOD PRESSURE: 138 MMHG | RESPIRATION RATE: 18 BRPM | HEART RATE: 80 BPM | DIASTOLIC BLOOD PRESSURE: 72 MMHG

## 2019-08-29 VITALS — DIASTOLIC BLOOD PRESSURE: 68 MMHG | HEART RATE: 86 BPM | RESPIRATION RATE: 16 BRPM | SYSTOLIC BLOOD PRESSURE: 128 MMHG

## 2019-08-29 VITALS
HEIGHT: 64 IN | WEIGHT: 138.45 LBS | BODY MASS INDEX: 23.64 KG/M2 | WEIGHT: 138.45 LBS | BODY MASS INDEX: 23.64 KG/M2 | HEIGHT: 64 IN

## 2019-08-29 VITALS — DIASTOLIC BLOOD PRESSURE: 75 MMHG | RESPIRATION RATE: 16 BRPM | SYSTOLIC BLOOD PRESSURE: 139 MMHG | HEART RATE: 82 BPM

## 2019-08-29 VITALS — RESPIRATION RATE: 18 BRPM | HEART RATE: 81 BPM | DIASTOLIC BLOOD PRESSURE: 68 MMHG | SYSTOLIC BLOOD PRESSURE: 130 MMHG

## 2019-08-29 VITALS — SYSTOLIC BLOOD PRESSURE: 136 MMHG | HEART RATE: 86 BPM | RESPIRATION RATE: 37 BRPM | DIASTOLIC BLOOD PRESSURE: 73 MMHG

## 2019-08-29 VITALS — SYSTOLIC BLOOD PRESSURE: 140 MMHG | HEART RATE: 88 BPM | RESPIRATION RATE: 16 BRPM | DIASTOLIC BLOOD PRESSURE: 66 MMHG

## 2019-08-29 VITALS — SYSTOLIC BLOOD PRESSURE: 133 MMHG | RESPIRATION RATE: 16 BRPM | DIASTOLIC BLOOD PRESSURE: 70 MMHG | HEART RATE: 79 BPM

## 2019-08-29 VITALS — HEART RATE: 88 BPM | SYSTOLIC BLOOD PRESSURE: 117 MMHG | RESPIRATION RATE: 17 BRPM | DIASTOLIC BLOOD PRESSURE: 70 MMHG

## 2019-08-29 VITALS — HEART RATE: 84 BPM | RESPIRATION RATE: 12 BRPM | SYSTOLIC BLOOD PRESSURE: 130 MMHG | DIASTOLIC BLOOD PRESSURE: 69 MMHG

## 2019-08-29 VITALS — DIASTOLIC BLOOD PRESSURE: 72 MMHG | RESPIRATION RATE: 16 BRPM | SYSTOLIC BLOOD PRESSURE: 136 MMHG | HEART RATE: 82 BPM

## 2019-08-29 VITALS — RESPIRATION RATE: 16 BRPM | SYSTOLIC BLOOD PRESSURE: 130 MMHG | DIASTOLIC BLOOD PRESSURE: 68 MMHG | HEART RATE: 81 BPM

## 2019-08-29 VITALS — DIASTOLIC BLOOD PRESSURE: 80 MMHG | RESPIRATION RATE: 16 BRPM | HEART RATE: 83 BPM | SYSTOLIC BLOOD PRESSURE: 155 MMHG

## 2019-08-29 VITALS — SYSTOLIC BLOOD PRESSURE: 113 MMHG | HEART RATE: 82 BPM | DIASTOLIC BLOOD PRESSURE: 68 MMHG | RESPIRATION RATE: 11 BRPM

## 2019-08-29 VITALS — RESPIRATION RATE: 11 BRPM | DIASTOLIC BLOOD PRESSURE: 69 MMHG | HEART RATE: 84 BPM | SYSTOLIC BLOOD PRESSURE: 123 MMHG

## 2019-08-29 VITALS — DIASTOLIC BLOOD PRESSURE: 72 MMHG | SYSTOLIC BLOOD PRESSURE: 140 MMHG | HEART RATE: 80 BPM | RESPIRATION RATE: 16 BRPM

## 2019-08-29 VITALS — DIASTOLIC BLOOD PRESSURE: 57 MMHG | SYSTOLIC BLOOD PRESSURE: 113 MMHG | HEART RATE: 86 BPM | RESPIRATION RATE: 21 BRPM

## 2019-08-29 VITALS — HEART RATE: 84 BPM | RESPIRATION RATE: 18 BRPM | DIASTOLIC BLOOD PRESSURE: 67 MMHG | SYSTOLIC BLOOD PRESSURE: 128 MMHG

## 2019-08-29 VITALS — DIASTOLIC BLOOD PRESSURE: 66 MMHG | RESPIRATION RATE: 15 BRPM | SYSTOLIC BLOOD PRESSURE: 118 MMHG | HEART RATE: 86 BPM

## 2019-08-29 VITALS — RESPIRATION RATE: 18 BRPM | DIASTOLIC BLOOD PRESSURE: 70 MMHG | SYSTOLIC BLOOD PRESSURE: 139 MMHG

## 2019-08-29 VITALS — SYSTOLIC BLOOD PRESSURE: 117 MMHG | RESPIRATION RATE: 17 BRPM | HEART RATE: 90 BPM | DIASTOLIC BLOOD PRESSURE: 66 MMHG

## 2019-08-29 VITALS — DIASTOLIC BLOOD PRESSURE: 67 MMHG | SYSTOLIC BLOOD PRESSURE: 131 MMHG | HEART RATE: 89 BPM | RESPIRATION RATE: 20 BRPM

## 2019-08-29 VITALS — HEART RATE: 81 BPM | SYSTOLIC BLOOD PRESSURE: 129 MMHG | DIASTOLIC BLOOD PRESSURE: 70 MMHG | RESPIRATION RATE: 16 BRPM

## 2019-08-29 DIAGNOSIS — M06.9: ICD-10-CM

## 2019-08-29 DIAGNOSIS — I10: ICD-10-CM

## 2019-08-29 DIAGNOSIS — M16.12: Primary | ICD-10-CM

## 2019-08-29 DIAGNOSIS — E78.5: ICD-10-CM

## 2019-08-29 LAB
ADD MAN DIFF?: NO
BASOPHIL #: 0 10^3/UL (ref 0–0.1)
BASOPHILS %: 0.4 % (ref 0–2)
EOSINOPHILS #: 0 10^3/UL (ref 0–0.5)
EOSINOPHILS %: 0.1 % (ref 0–7)
HEMATOCRIT: 28.6 % (ref 37–47)
HEMOGLOBIN: 9.3 G/DL (ref 12–16)
HOLD TRANSMISSIONS: 1
IMMATURE GRANS #M: 0.11 10^3/UL (ref 0–0.03)
IMMATURE GRANS % (M): 1.3 % (ref 0–0.43)
LYMPHOCYTES #: 1.5 10^3/UL (ref 0.8–2.9)
LYMPHOCYTES %: 17.6 % (ref 15–51)
MEAN CORPUSCULAR HEMOGLOBIN: 33.2 PG (ref 29–33)
MEAN CORPUSCULAR HGB CONC: 32.5 G/DL (ref 32–37)
MEAN CORPUSCULAR VOLUME: 102.1 FL (ref 82–101)
MEAN PLATELET VOLUME: 8.6 FL (ref 7.4–10.4)
MONOCYTE #: 0.3 10^3/UL (ref 0.3–0.9)
MONOCYTES %: 3 % (ref 0–11)
NEUTROPHIL #: 6.5 10^3/UL (ref 1.6–7.5)
NEUTROPHILS %: 77.6 % (ref 39–77)
NUCLEATED RED BLOOD CELLS #: 0 10^3/UL (ref 0–0)
NUCLEATED RED BLOOD CELLS%: 0 /100WBC (ref 0–0)
PLATELET COUNT: 298 10^3/UL (ref 140–415)
RED BLOOD COUNT: 2.8 10^6/UL (ref 4.2–5.4)
RED CELL DISTRIBUTION WIDTH: 13.4 % (ref 11.5–14.5)
WHITE BLOOD COUNT: 8.4 10^3/UL (ref 4.8–10.8)

## 2019-08-29 PROCEDURE — C1713 ANCHOR/SCREW BN/BN,TIS/BN: HCPCS

## 2019-08-29 PROCEDURE — 97161 PT EVAL LOW COMPLEX 20 MIN: CPT

## 2019-08-29 PROCEDURE — 86999 UNLISTED TRANSFUSION MED PX: CPT

## 2019-08-29 PROCEDURE — 97530 THERAPEUTIC ACTIVITIES: CPT

## 2019-08-29 PROCEDURE — 97116 GAIT TRAINING THERAPY: CPT

## 2019-08-29 PROCEDURE — 88311 DECALCIFY TISSUE: CPT

## 2019-08-29 PROCEDURE — 72170 X-RAY EXAM OF PELVIS: CPT

## 2019-08-29 PROCEDURE — 88304 TISSUE EXAM BY PATHOLOGIST: CPT

## 2019-08-29 PROCEDURE — 85025 COMPLETE CBC W/AUTO DIFF WBC: CPT

## 2019-08-29 PROCEDURE — C1776 JOINT DEVICE (IMPLANTABLE): HCPCS

## 2019-08-29 PROCEDURE — 87086 URINE CULTURE/COLONY COUNT: CPT

## 2019-08-29 PROCEDURE — 73530: CPT

## 2019-08-29 PROCEDURE — 0SRB04A REPLACEMENT OF LEFT HIP JOINT WITH CERAMIC ON POLYETHYLENE SYNTHETIC SUBSTITUTE, UNCEMENTED, OPEN APPROACH: ICD-10-PCS | Performed by: ORTHOPAEDIC SURGERY

## 2019-08-29 PROCEDURE — 0SRB04A REPLACEMENT OF LEFT HIP JOINT WITH CERAMIC ON POLYETHYLENE SYNTHETIC SUBSTITUTE, UNCEMENTED, OPEN APPROACH: ICD-10-PCS

## 2019-08-29 RX ADMIN — VASOPRESSIN 1 ML: 20 INJECTION, SOLUTION INTRAMUSCULAR; SUBCUTANEOUS at 07:30

## 2019-08-29 RX ADMIN — BACITRACIN 1 ML: 50000 INJECTION, POWDER, FOR SOLUTION INTRAMUSCULAR at 07:27

## 2019-08-29 RX ADMIN — CEFAZOLIN 1 MLS/HR: 1 INJECTION, POWDER, FOR SOLUTION INTRAMUSCULAR; INTRAVENOUS at 17:29

## 2019-08-29 RX ADMIN — DOCUSATE SODIUM AND SENNOSIDES SCH TAB: 8.6; 5 TABLET, FILM COATED ORAL at 20:38

## 2019-08-29 RX ADMIN — CEFAZOLIN SCH MLS/HR: 1 INJECTION, POWDER, FOR SOLUTION INTRAMUSCULAR; INTRAVENOUS at 17:29

## 2019-08-29 RX ADMIN — PYRIDOXINE HYDROCHLORIDE SCH MLS/HR: 100 INJECTION, SOLUTION INTRAMUSCULAR; INTRAVENOUS at 20:40

## 2019-08-29 RX ADMIN — LOSARTAN POTASSIUM 1 MG: 25 TABLET ORAL at 09:00

## 2019-08-29 RX ADMIN — PYRIDOXINE HYDROCHLORIDE 1 MLS/HR: 100 INJECTION, SOLUTION INTRAMUSCULAR; INTRAVENOUS at 08:42

## 2019-08-29 RX ADMIN — DOCUSATE SODIUM AND SENNOSIDES 1 TAB: 8.6; 5 TABLET, FILM COATED ORAL at 20:38

## 2019-08-29 RX ADMIN — ATENOLOL SCH MG: 50 TABLET ORAL at 09:00

## 2019-08-29 RX ADMIN — ATENOLOL 1 MG: 50 TABLET ORAL at 09:00

## 2019-08-29 RX ADMIN — CEFAZOLIN 1 MLS/HR: 1 INJECTION, POWDER, FOR SOLUTION INTRAMUSCULAR; INTRAVENOUS at 10:17

## 2019-08-29 RX ADMIN — LOSARTAN POTASSIUM SCH MG: 25 TABLET, FILM COATED ORAL at 09:00

## 2019-08-29 RX ADMIN — ONDANSETRON HYDROCHLORIDE 1 MG: 2 INJECTION, SOLUTION INTRAMUSCULAR; INTRAVENOUS at 17:36

## 2019-08-29 RX ADMIN — GABAPENTIN 1 MG: 300 CAPSULE ORAL at 06:30

## 2019-08-29 RX ADMIN — CEFAZOLIN SODIUM 1 MLS/HR: 2 SOLUTION INTRAVENOUS at 06:00

## 2019-08-29 RX ADMIN — Medication SCH MLS/HR: at 17:29

## 2019-08-29 RX ADMIN — PYRIDOXINE HYDROCHLORIDE SCH MLS/HR: 100 INJECTION, SOLUTION INTRAMUSCULAR; INTRAVENOUS at 08:42

## 2019-08-29 RX ADMIN — Medication SCH MLS/HR: at 10:17

## 2019-08-29 RX ADMIN — DOCUSATE SODIUM AND SENNOSIDES SCH TAB: 8.6; 5 TABLET, FILM COATED ORAL at 09:00

## 2019-08-29 RX ADMIN — GABAPENTIN 1 MG: 300 CAPSULE ORAL at 20:38

## 2019-08-29 RX ADMIN — Medication 1 MLS/HR: at 17:29

## 2019-08-29 RX ADMIN — PYRIDOXINE HYDROCHLORIDE 1 MLS/HR: 100 INJECTION, SOLUTION INTRAMUSCULAR; INTRAVENOUS at 18:42

## 2019-08-29 RX ADMIN — CEFAZOLIN SCH MLS/HR: 1 INJECTION, POWDER, FOR SOLUTION INTRAMUSCULAR; INTRAVENOUS at 10:17

## 2019-08-29 RX ADMIN — ZOLPIDEM TARTRATE 1 MG: 5 TABLET, FILM COATED ORAL at 23:39

## 2019-08-29 RX ADMIN — TRANEXAMIC ACID 1 MLS/HR: 10 INJECTION, SOLUTION INTRAVENOUS at 06:00

## 2019-08-29 RX ADMIN — PYRIDOXINE HYDROCHLORIDE SCH MLS/HR: 100 INJECTION, SOLUTION INTRAMUSCULAR; INTRAVENOUS at 18:42

## 2019-08-29 RX ADMIN — DOCUSATE SODIUM AND SENNOSIDES 1 TAB: 8.6; 5 TABLET, FILM COATED ORAL at 09:00

## 2019-08-29 RX ADMIN — PYRIDOXINE HYDROCHLORIDE 1 MLS/HR: 100 INJECTION, SOLUTION INTRAMUSCULAR; INTRAVENOUS at 20:40

## 2019-08-29 RX ADMIN — DIPHENHYDRAMINE HYDROCHLORIDE 1 MG: 50 INJECTION, SOLUTION INTRAMUSCULAR; INTRAVENOUS at 22:37

## 2019-08-29 RX ADMIN — Medication 1 MLS/HR: at 10:17

## 2019-08-29 RX ADMIN — PYRIDOXINE HYDROCHLORIDE 1 MLS/HR: 100 INJECTION, SOLUTION INTRAMUSCULAR; INTRAVENOUS at 08:57

## 2019-08-29 RX ADMIN — FENTANYL CITRATE 1 MCG: 50 INJECTION, SOLUTION INTRAMUSCULAR; INTRAVENOUS at 11:16

## 2019-08-29 RX ADMIN — ONDANSETRON HYDROCHLORIDE 1 MG: 2 INJECTION, SOLUTION INTRAMUSCULAR; INTRAVENOUS at 11:16

## 2019-08-30 VITALS — HEART RATE: 88 BPM | RESPIRATION RATE: 17 BRPM | DIASTOLIC BLOOD PRESSURE: 69 MMHG | SYSTOLIC BLOOD PRESSURE: 132 MMHG

## 2019-08-30 VITALS — DIASTOLIC BLOOD PRESSURE: 79 MMHG | SYSTOLIC BLOOD PRESSURE: 151 MMHG | RESPIRATION RATE: 18 BRPM | HEART RATE: 89 BPM

## 2019-08-30 VITALS — SYSTOLIC BLOOD PRESSURE: 145 MMHG | DIASTOLIC BLOOD PRESSURE: 68 MMHG | HEART RATE: 87 BPM | RESPIRATION RATE: 18 BRPM

## 2019-08-30 VITALS — HEART RATE: 102 BPM | SYSTOLIC BLOOD PRESSURE: 146 MMHG | DIASTOLIC BLOOD PRESSURE: 72 MMHG | RESPIRATION RATE: 20 BRPM

## 2019-08-30 VITALS — HEART RATE: 86 BPM

## 2019-08-30 LAB
ADD MAN DIFF?: NO
BASOPHIL #: 0 10^3/UL (ref 0–0.1)
BASOPHILS %: 0.4 % (ref 0–2)
EOSINOPHILS #: 0 10^3/UL (ref 0–0.5)
EOSINOPHILS %: 0.4 % (ref 0–7)
HEMATOCRIT: 27.1 % (ref 37–47)
HEMOGLOBIN: 8.6 G/DL (ref 12–16)
IMMATURE GRANS #M: 0.03 10^3/UL (ref 0–0.03)
IMMATURE GRANS % (M): 0.4 % (ref 0–0.43)
LYMPHOCYTES #: 2.7 10^3/UL (ref 0.8–2.9)
LYMPHOCYTES %: 38.6 % (ref 15–51)
MEAN CORPUSCULAR HEMOGLOBIN: 32.6 PG (ref 29–33)
MEAN CORPUSCULAR HGB CONC: 31.7 G/DL (ref 32–37)
MEAN CORPUSCULAR VOLUME: 102.7 FL (ref 82–101)
MEAN PLATELET VOLUME: 9 FL (ref 7.4–10.4)
MONOCYTE #: 0.7 10^3/UL (ref 0.3–0.9)
MONOCYTES %: 9.6 % (ref 0–11)
NEUTROPHIL #: 3.5 10^3/UL (ref 1.6–7.5)
NEUTROPHILS %: 50.6 % (ref 39–77)
NUCLEATED RED BLOOD CELLS #: 0 10^3/UL (ref 0–0)
NUCLEATED RED BLOOD CELLS%: 0 /100WBC (ref 0–0)
PLATELET COUNT: 246 10^3/UL (ref 140–415)
RED BLOOD COUNT: 2.64 10^6/UL (ref 4.2–5.4)
RED CELL DISTRIBUTION WIDTH: 14 % (ref 11.5–14.5)
WHITE BLOOD COUNT: 7 10^3/UL (ref 4.8–10.8)

## 2019-08-30 RX ADMIN — PYRIDOXINE HYDROCHLORIDE 1 MLS/HR: 100 INJECTION, SOLUTION INTRAMUSCULAR; INTRAVENOUS at 14:42

## 2019-08-30 RX ADMIN — PYRIDOXINE HYDROCHLORIDE SCH MLS/HR: 100 INJECTION, SOLUTION INTRAMUSCULAR; INTRAVENOUS at 14:42

## 2019-08-30 RX ADMIN — DOCUSATE SODIUM AND SENNOSIDES 1 TAB: 8.6; 5 TABLET, FILM COATED ORAL at 08:52

## 2019-08-30 RX ADMIN — HYDROMORPHONE HYDROCHLORIDE PRN MG: 1 INJECTION, SOLUTION INTRAMUSCULAR; INTRAVENOUS; SUBCUTANEOUS at 14:20

## 2019-08-30 RX ADMIN — LOSARTAN POTASSIUM 1 MG: 25 TABLET ORAL at 08:53

## 2019-08-30 RX ADMIN — ATENOLOL SCH MG: 50 TABLET ORAL at 08:52

## 2019-08-30 RX ADMIN — HYDROMORPHONE HYDROCHLORIDE 1 MG: 1 INJECTION, SOLUTION INTRAMUSCULAR; INTRAVENOUS; SUBCUTANEOUS at 07:51

## 2019-08-30 RX ADMIN — ASPIRIN 1 MG: 325 TABLET, DELAYED RELEASE ORAL at 08:52

## 2019-08-30 RX ADMIN — ATENOLOL 1 MG: 50 TABLET ORAL at 08:52

## 2019-08-30 RX ADMIN — LOSARTAN POTASSIUM SCH MG: 25 TABLET, FILM COATED ORAL at 08:53

## 2019-08-30 RX ADMIN — CEFAZOLIN SCH MLS/HR: 1 INJECTION, POWDER, FOR SOLUTION INTRAMUSCULAR; INTRAVENOUS at 01:03

## 2019-08-30 RX ADMIN — HYDROMORPHONE HYDROCHLORIDE 1 MG: 1 INJECTION, SOLUTION INTRAMUSCULAR; INTRAVENOUS; SUBCUTANEOUS at 14:20

## 2019-08-30 RX ADMIN — Medication SCH MLS/HR: at 01:03

## 2019-08-30 RX ADMIN — HYDROMORPHONE HYDROCHLORIDE PRN MG: 1 INJECTION, SOLUTION INTRAMUSCULAR; INTRAVENOUS; SUBCUTANEOUS at 07:51

## 2019-08-30 RX ADMIN — CEFAZOLIN 1 MLS/HR: 1 INJECTION, POWDER, FOR SOLUTION INTRAMUSCULAR; INTRAVENOUS at 01:03

## 2019-08-30 RX ADMIN — DOCUSATE SODIUM AND SENNOSIDES SCH TAB: 8.6; 5 TABLET, FILM COATED ORAL at 08:52

## 2019-08-30 RX ADMIN — Medication 1 MLS/HR: at 01:03
